# Patient Record
Sex: FEMALE | Race: WHITE
[De-identification: names, ages, dates, MRNs, and addresses within clinical notes are randomized per-mention and may not be internally consistent; named-entity substitution may affect disease eponyms.]

---
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
## 2019-01-06 ENCOUNTER — HOSPITAL ENCOUNTER (EMERGENCY)
Dept: HOSPITAL 10 - FTE | Age: 19
Discharge: HOME | End: 2019-01-06
Payer: COMMERCIAL

## 2019-01-06 ENCOUNTER — HOSPITAL ENCOUNTER (EMERGENCY)
Dept: HOSPITAL 91 - FTE | Age: 19
Discharge: HOME | End: 2019-01-06
Payer: COMMERCIAL

## 2019-01-06 VITALS — DIASTOLIC BLOOD PRESSURE: 63 MMHG | RESPIRATION RATE: 20 BRPM | SYSTOLIC BLOOD PRESSURE: 120 MMHG | HEART RATE: 87 BPM

## 2019-01-06 VITALS
WEIGHT: 160.06 LBS | HEIGHT: 66 IN | BODY MASS INDEX: 25.72 KG/M2 | WEIGHT: 160.06 LBS | BODY MASS INDEX: 25.72 KG/M2 | HEIGHT: 66 IN
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
## 2019-01-06 DIAGNOSIS — S63.92XA: Primary | ICD-10-CM
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
## 2019-01-06 PROCEDURE — 73110 X-RAY EXAM OF WRIST: CPT

## 2019-01-06 PROCEDURE — 29125 APPL SHORT ARM SPLINT STATIC: CPT

## 2019-01-06 PROCEDURE — 99283 EMERGENCY DEPT VISIT LOW MDM: CPT

## 2019-01-06 RX ADMIN — IBUPROFEN 1 MG: 800 TABLET, FILM COATED ORAL at 20:10

## 2019-01-06 NOTE — ERD
ER Documentation


Chief Complaint


Chief Complaint





Complains of left hand /wrist pain after skate boarding





HPI


18-year-old female presenting with pain to left hand.  2 weeks ago patient was s


nowboarding and she hyperextended her wrist.  She states she has had pain ever 


since.  She is right-hand dominant.  Has not taken medications for symptoms.  


Denies medical problems.  NKDA.  Surgical history denies.  Social history denies





ROS


All systems reviewed and are negative except as per history of present illness.





Medications


Home Meds


Active Scripts


Naproxen* (Naprosyn*) 500 Mg Tablet, 500 MG PO BID PRN for PAIN AND/OR 


INFLAMMATION, #30 TAB


   Prov:ZOEY TORRES PA-C         18


Cetirizine Hcl* (Zyrtec*) 10 Mg Capsule, 10 MG PO DAILY, #30 TAB.CHEW


   Prov:DOTTIE CONWAY NP         3/1/17


Guaifenesin-D-Methorphan Hb* (Guaifenesin* DM Syrup) 120 Ml Syrup, 10 ML PO Q4H 


PRN for COUGH, #120 ML


   Prov:DOTTIE CONWAY NP         3/1/17


Ibuprofen* (Motrin*) 400 Mg Tab, 400 MG PO Q6H PRN for PAIN AND OR ELEVATED 


TEMP, #30 TAB


   Prov:DOTTIE CONWAY NP         3/1/17





Allergies


Allergies:  


Coded Allergies:  


     No Known Allergy (Unverified , 19)





PMhx/Soc


History of Surgery:  No


Anesthesia Reaction:  No


Hx Neurological Disorder:  No


Hx Respiratory Disorders:  No


Hx Cardiac Disorders:  No


Hx Psychiatric Problems:  No


Hx Miscellaneous Medical Probl:  No (DENIES MEDICAL AND SURGICAL HX.)


Hx Alcohol Use:  No


Hx Substance Use:  No


Hx Tobacco Use:  No


Smoking Status:  Never smoker





FmHx


Family History:  No diabetes, No coronary disease, No other





Physical Exam


Vitals





Vital Signs


  Date      Temp  Pulse  Resp  B/P (MAP)   Pulse Ox  O2          O2 Flow    FiO2


Time                                                 Delivery    Rate


    19  97.8


     21:28


    19  98.9     87    20      120/63        99


     18:29                           (82)





Physical Exam


GENERAL: The patient is well-appearing, well-nourished, in no acute distress


CHEST: Clear to auscultation bilaterally.  There are no rales, wheezes or 


rhonchi.


HEART: Regular rate and rhythm.  No murmurs, clicks, rubs or gallops.  No S3 or 


S4.


EXTREMITIES: Equal pulses bilaterally.  There is no peripheral clubbing, 


cyanosis or edema.  No focal swelling or erythema.  Full range of motion.  Coral


ssly neurovascularly intact.


NEUROLOGIC: Alert and oriented.  Cranial nerves II through XII intact.  Motor 


strength in all 4 extremities with 5 out of 5 strength.  Sensation grossly 


intact.  Normal speech and gait.  Babinski negative.  DTR 2+ throughout.


SKIN: There is no apparent rash or petechiae.  The skin is warm and dry.


Results 24 hrs





Current Medications


 Medications
   Dose
          Sig/Melissa
       Start Time
   Status  Last


 (Trade)       Ordered        Route
 PRN     Stop Time              Admin
Dose


                              Reason                                Admin


 Ibuprofen
     800 mg         ONCE  ONCE
    19        DC            19


(Motrin)                      PO
            20:00
 19                20:10



                                             20:02








Procedures/MDM


                            DIAGNOSTIC IMAGING REPORT





Patient: GIULIA CHAN   : 2000   Age: 18  Sex: F                   


    


       MR #:    [id number]   [facility]   [id number]    DOS: 18


Ordering MD: JERRI TORRES PA-C   Location:  [facility]   Room/Bed:            


                               


                                        


PROCEDURE:   XR Wrist. 


 


CLINICAL INDICATION:   Left wrist pain. 


 


TECHNIQUE:   AP, lateral and oblique views of the left wrist were performed. 


Images were reviewed on a PACS workstation.


 


COMPARISON:   No prior studies are available for comparison. 


 


FINDINGS:


The distal radius and ulna are normal in appearance.  The radiocarpal joint is 


maintained.  There is no evidence of fracture, dislocation, or subluxation is 


seen. The carpal bones and intercarpal joints are normal in appearance.  The 


alignment of the carpal bones is within normal limits.  The marrow density is 


normal.  There is no significant soft tissue swelling. 


 


IMPRESSION:


1.  Normal radiographs of the left wrist. No evidence of fracture.


 





ER Course: Velcro splint given in ED.  Patient is neuro intact pre-and post 


splint application.





MDM: 18-year-old female presenting with wrist pain.  I have low suspicion for 


acute fracture dislocation.  Patient likely sustained a sprain.  Patient is 


discharged with supportive medications.  There is no deformity or obvious pain 


on palpation.  Patient is told symptoms change or worsen to return ER 


immediately.  All questions answered at discharge





Departure


Diagnosis:  


   Primary Impression:  


   Hand sprain


Condition:  Stable


Patient Instructions:  Sprain Hand





Additional Instructions:  


FOLLOW UP WITH YOUR PRIMARY CARE PHYSICIAN TOMORROW.Return to this facility if 


you are not improving as expected.











ZOEY TORRES PA-C        2019 23:37